# Patient Record
Sex: FEMALE | Race: BLACK OR AFRICAN AMERICAN | NOT HISPANIC OR LATINO
[De-identification: names, ages, dates, MRNs, and addresses within clinical notes are randomized per-mention and may not be internally consistent; named-entity substitution may affect disease eponyms.]

---

## 2017-03-09 ENCOUNTER — APPOINTMENT (OUTPATIENT)
Dept: CARDIOLOGY | Facility: CLINIC | Age: 82
End: 2017-03-09

## 2017-03-17 ENCOUNTER — APPOINTMENT (OUTPATIENT)
Dept: CARDIOLOGY | Facility: CLINIC | Age: 82
End: 2017-03-17

## 2017-10-20 ENCOUNTER — APPOINTMENT (OUTPATIENT)
Dept: CARDIOLOGY | Facility: CLINIC | Age: 82
End: 2017-10-20
Payer: MEDICARE

## 2017-10-20 PROCEDURE — 93000 ELECTROCARDIOGRAM COMPLETE: CPT

## 2017-10-20 PROCEDURE — 99214 OFFICE O/P EST MOD 30 MIN: CPT

## 2018-04-24 ENCOUNTER — RECORD ABSTRACTING (OUTPATIENT)
Age: 83
End: 2018-04-24

## 2018-04-26 ENCOUNTER — APPOINTMENT (OUTPATIENT)
Dept: CARDIOLOGY | Facility: CLINIC | Age: 83
End: 2018-04-26
Payer: MEDICARE

## 2018-04-26 PROCEDURE — 93306 TTE W/DOPPLER COMPLETE: CPT

## 2018-04-26 PROCEDURE — 93880 EXTRACRANIAL BILAT STUDY: CPT

## 2018-04-30 PROCEDURE — 93224 XTRNL ECG REC UP TO 48 HRS: CPT

## 2018-05-01 ENCOUNTER — RECORD ABSTRACTING (OUTPATIENT)
Age: 83
End: 2018-05-01

## 2018-05-01 RX ORDER — ASPIRIN 81 MG
81 TABLET, DELAYED RELEASE (ENTERIC COATED) ORAL DAILY
Refills: 0 | Status: ACTIVE | COMMUNITY

## 2018-05-01 RX ORDER — FEXOFENADINE HYDROCHLORIDE 180 MG/1
180 TABLET, FILM COATED ORAL DAILY
Refills: 0 | Status: ACTIVE | COMMUNITY

## 2018-05-01 RX ORDER — TOLTERODINE TARTRATE 2 MG/1
2 TABLET, FILM COATED ORAL DAILY
Refills: 0 | Status: ACTIVE | COMMUNITY

## 2018-05-01 RX ORDER — PRAVASTATIN SODIUM 20 MG/1
20 TABLET ORAL
Refills: 0 | Status: ACTIVE | COMMUNITY

## 2018-05-01 RX ORDER — LORAZEPAM 0.5 MG/1
0.5 TABLET ORAL TWICE DAILY
Refills: 0 | Status: ACTIVE | COMMUNITY

## 2018-05-01 RX ORDER — PIOGLITAZONE 45 MG/1
45 TABLET ORAL DAILY
Refills: 0 | Status: ACTIVE | COMMUNITY

## 2018-05-01 RX ORDER — SPIRONOLACTONE AND HYDROCHLOROTHIAZIDE 25; 25 MG/1; MG/1
25-25 TABLET, FILM COATED ORAL EVERY OTHER DAY
Refills: 0 | Status: ACTIVE | COMMUNITY

## 2018-05-01 RX ORDER — LIRAGLUTIDE 6 MG/ML
18 INJECTION SUBCUTANEOUS DAILY
Refills: 0 | Status: ACTIVE | COMMUNITY

## 2018-05-01 RX ORDER — METFORMIN HYDROCHLORIDE 500 MG/1
500 TABLET, COATED ORAL DAILY
Refills: 0 | Status: ACTIVE | COMMUNITY

## 2018-05-04 ENCOUNTER — RECORD ABSTRACTING (OUTPATIENT)
Age: 83
End: 2018-05-04

## 2018-05-04 ENCOUNTER — APPOINTMENT (OUTPATIENT)
Dept: CARDIOLOGY | Facility: CLINIC | Age: 83
End: 2018-05-04
Payer: MEDICARE

## 2018-05-04 VITALS
HEART RATE: 76 BPM | SYSTOLIC BLOOD PRESSURE: 110 MMHG | WEIGHT: 220 LBS | DIASTOLIC BLOOD PRESSURE: 60 MMHG | BODY MASS INDEX: 43.19 KG/M2 | OXYGEN SATURATION: 95 % | HEIGHT: 60 IN

## 2018-05-04 DIAGNOSIS — Z86.39 PERSONAL HISTORY OF OTHER ENDOCRINE, NUTRITIONAL AND METABOLIC DISEASE: ICD-10-CM

## 2018-05-04 DIAGNOSIS — Z86.59 PERSONAL HISTORY OF OTHER MENTAL AND BEHAVIORAL DISORDERS: ICD-10-CM

## 2018-05-04 DIAGNOSIS — Z86.79 PERSONAL HISTORY OF OTHER DISEASES OF THE CIRCULATORY SYSTEM: ICD-10-CM

## 2018-05-04 DIAGNOSIS — Z87.891 PERSONAL HISTORY OF NICOTINE DEPENDENCE: ICD-10-CM

## 2018-05-04 DIAGNOSIS — Z83.3 FAMILY HISTORY OF DIABETES MELLITUS: ICD-10-CM

## 2018-05-04 PROCEDURE — 99214 OFFICE O/P EST MOD 30 MIN: CPT

## 2018-09-04 ENCOUNTER — OUTPATIENT (OUTPATIENT)
Dept: OUTPATIENT SERVICES | Facility: HOSPITAL | Age: 83
LOS: 1 days | End: 2018-09-04

## 2018-11-01 ENCOUNTER — OUTPATIENT (OUTPATIENT)
Dept: OUTPATIENT SERVICES | Facility: HOSPITAL | Age: 83
LOS: 1 days | End: 2018-11-01

## 2018-12-31 ENCOUNTER — RECORD ABSTRACTING (OUTPATIENT)
Age: 83
End: 2018-12-31

## 2019-01-08 ENCOUNTER — NON-APPOINTMENT (OUTPATIENT)
Age: 84
End: 2019-01-08

## 2019-01-08 ENCOUNTER — APPOINTMENT (OUTPATIENT)
Dept: CARDIOLOGY | Facility: CLINIC | Age: 84
End: 2019-01-08
Payer: MEDICARE

## 2019-01-08 VITALS
HEART RATE: 76 BPM | OXYGEN SATURATION: 96 % | DIASTOLIC BLOOD PRESSURE: 60 MMHG | WEIGHT: 220 LBS | BODY MASS INDEX: 43.19 KG/M2 | SYSTOLIC BLOOD PRESSURE: 120 MMHG | HEIGHT: 60 IN

## 2019-01-08 PROCEDURE — 99214 OFFICE O/P EST MOD 30 MIN: CPT

## 2019-01-08 PROCEDURE — 93000 ELECTROCARDIOGRAM COMPLETE: CPT

## 2019-05-01 ENCOUNTER — APPOINTMENT (OUTPATIENT)
Dept: CARDIOLOGY | Facility: CLINIC | Age: 84
End: 2019-05-01
Payer: MEDICARE

## 2019-05-01 PROCEDURE — 93306 TTE W/DOPPLER COMPLETE: CPT

## 2019-05-01 PROCEDURE — 93880 EXTRACRANIAL BILAT STUDY: CPT

## 2019-05-06 PROCEDURE — 93224 XTRNL ECG REC UP TO 48 HRS: CPT

## 2019-06-11 ENCOUNTER — APPOINTMENT (OUTPATIENT)
Dept: CARDIOLOGY | Facility: CLINIC | Age: 84
End: 2019-06-11
Payer: MEDICARE

## 2019-06-11 VITALS
HEART RATE: 70 BPM | BODY MASS INDEX: 41.62 KG/M2 | WEIGHT: 212 LBS | OXYGEN SATURATION: 95 % | SYSTOLIC BLOOD PRESSURE: 120 MMHG | HEIGHT: 60 IN | DIASTOLIC BLOOD PRESSURE: 70 MMHG

## 2019-06-11 PROCEDURE — 99214 OFFICE O/P EST MOD 30 MIN: CPT

## 2019-06-11 NOTE — DISCUSSION/SUMMARY
[FreeTextEntry1] : Ling is a 85-year-old female with medical history detailed above and active medical issues including:\par \par - Dyspnea on exertion with multiple CAD risk factors. Patient will have noninvasive testing with a Lexascan Myoview stress test to assess for obstructive CAD.\par \par - Edema, HFpEF LVEF 60% echo Jan 2019, optimized on aldactone HCTZ\par \par - Hypertension at BP goal on diuretic therapy\par \par - Obesity.  Discussed calorie reduction and increased exercise as a weight reduction strategy.\par \par - Type 2 diabetes management with PCP\par \par - Dyslipidemia on Pravachol well tolerated\par \par Advised patient to follow active lifestyle with regular cardiovascular exercise. Patient educated on lifestyle and diet modification with low sodium low fat diet and avoidance of excessive alcohol. Patient is aware to call with any symptoms or concerns. \par  \par Patient will be seen in cardiology follow-up after noninvasive testing.\par \par Ling a followup with Dr Eugene Lane for primary care\par \par

## 2019-06-11 NOTE — REASON FOR VISIT
[Follow-Up - Clinic] : a clinic follow-up of [FreeTextEntry2] : dyspnea on exertion, multiple CAD risk factors [FreeTextEntry1] : Ling is an 85-year-old female with history of hypertension, DM 2, obesity, HFpEF, edema, claudication, palpitations, partial deafness.\par \par Patient has dyspnea with moderate exertion.Cardiovascular review of symptoms is negative for exertional chest pain, palpitations, dizziness or syncope.  No PND or orthopnea leg edema.  No bleeding or black stool.\par \par No exercise routine. Patient is walking less than 5 minutes unable to complete a treadmill stress test.  Patient will be scheduled for pharmacologic stress test.\par \par \par Echocardiogram May 2019, LVEF 65%, mild diastolic dysfunction, aneurysmal interatrial septum, normal RVSP\par \par Carotid Doppler May 2019 mild nonobstructive plaque

## 2019-06-11 NOTE — REVIEW OF SYSTEMS
[Negative] : Heme/Lymph [Joint Pain] : joint pain [Dyspnea on exertion] : dyspnea during exertion [Joint Stiffness] : joint stiffness [FreeTextEntry1] : limited exercise capacity

## 2019-06-11 NOTE — PHYSICAL EXAM
[Normal Appearance] : normal appearance [Eyelids - No Xanthelasma] : the eyelids demonstrated no xanthelasmas [No Oral Pallor] : no oral pallor [Normal Jugular Venous A Waves Present] : normal jugular venous A waves present [Respiration, Rhythm And Depth] : normal respiratory rhythm and effort [Heart Rate And Rhythm] : heart rate and rhythm were normal [Heart Sounds] : normal S1 and S2 [Bowel Sounds] : normal bowel sounds [Abdomen Soft] : soft [Abdomen Tenderness] : non-tender [Abnormal Walk] : normal gait [Nail Clubbing] : no clubbing of the fingernails [Cyanosis, Localized] : no localized cyanosis [Petechial Hemorrhages (___cm)] : no petechial hemorrhages [Skin Color & Pigmentation] : normal skin color and pigmentation [Skin Turgor] : normal skin turgor [] : no rash [Oriented To Time, Place, And Person] : oriented to person, place, and time [FreeTextEntry1] : obesity

## 2019-06-17 ENCOUNTER — MEDICATION RENEWAL (OUTPATIENT)
Age: 84
End: 2019-06-17

## 2019-06-19 ENCOUNTER — APPOINTMENT (OUTPATIENT)
Dept: CARDIOLOGY | Facility: CLINIC | Age: 84
End: 2019-06-19
Payer: MEDICARE

## 2019-06-19 PROCEDURE — A9502: CPT

## 2019-06-19 PROCEDURE — 78452 HT MUSCLE IMAGE SPECT MULT: CPT

## 2019-06-19 PROCEDURE — 93015 CV STRESS TEST SUPVJ I&R: CPT

## 2019-06-27 ENCOUNTER — APPOINTMENT (OUTPATIENT)
Dept: CARDIOLOGY | Facility: CLINIC | Age: 84
End: 2019-06-27
Payer: MEDICARE

## 2019-06-27 VITALS
WEIGHT: 218 LBS | HEART RATE: 75 BPM | DIASTOLIC BLOOD PRESSURE: 70 MMHG | OXYGEN SATURATION: 95 % | HEIGHT: 61 IN | BODY MASS INDEX: 41.16 KG/M2 | SYSTOLIC BLOOD PRESSURE: 120 MMHG

## 2019-06-27 PROCEDURE — 99214 OFFICE O/P EST MOD 30 MIN: CPT

## 2019-06-27 NOTE — PHYSICAL EXAM
[Normal Appearance] : normal appearance [Eyelids - No Xanthelasma] : the eyelids demonstrated no xanthelasmas [No Oral Pallor] : no oral pallor [Normal Jugular Venous A Waves Present] : normal jugular venous A waves present [Respiration, Rhythm And Depth] : normal respiratory rhythm and effort [Heart Sounds] : normal S1 and S2 [Heart Rate And Rhythm] : heart rate and rhythm were normal [Abdomen Tenderness] : non-tender [Bowel Sounds] : normal bowel sounds [Abdomen Soft] : soft [Nail Clubbing] : no clubbing of the fingernails [Abnormal Walk] : normal gait [Cyanosis, Localized] : no localized cyanosis [Petechial Hemorrhages (___cm)] : no petechial hemorrhages [Skin Color & Pigmentation] : normal skin color and pigmentation [Skin Turgor] : normal skin turgor [] : no rash [Oriented To Time, Place, And Person] : oriented to person, place, and time [FreeTextEntry1] : obesity

## 2019-06-27 NOTE — REASON FOR VISIT
[Follow-Up - Clinic] : a clinic follow-up of [FreeTextEntry2] : noninvasive testing for dyspnea on exertion, multiple CAD risk factors [FreeTextEntry1] : Ling is an 85-year-old female with history of hypertension, DM 2, obesity, HFpEF, edema, claudication, palpitations, partial deafness.\par \par Patient has dyspnea with moderate exertion.Cardiovascular review of symptoms is negative for exertional chest pain, palpitations, dizziness or syncope.  No PND or orthopnea leg edema.  No bleeding or black stool.\par \par No exercise routine. Patient is walking less than 5 minutes unable to complete a treadmill stress test.  Patient will be scheduled for pharmacologic stress test.\par \par Exercise Myoview stress test June 2019, LVEF 65%, small fixed apical defect and the presence of breast attenuation and normal wall motion likely artifact, no chest pain, nonischemic EKG response, baseline sinus rhythm IRBBB low-voltage\par \par Echocardiogram May 2019, LVEF 65%, mild diastolic dysfunction, aneurysmal interatrial septum, normal RVSP\par \par Carotid Doppler May 2019 mild nonobstructive plaque

## 2019-06-27 NOTE — DISCUSSION/SUMMARY
[FreeTextEntry1] : Ling is a 85-year-old female with medical history detailed above and active medical issues including:\par \par - Dyspnea on exertion with normal perfusion Myoview stress test normal LVEF June 2019.\par \par - Edema, HFpEF LVEF 60% echo Jan 2019, optimized on aldactone HCTZ\par \par - Hypertension at BP goal on diuretic therapy\par \par - Obesity.  Discussed calorie reduction and increased exercise as a weight reduction strategy.\par \par - Type 2 diabetes management with PCP\par \par - Dyslipidemia on Pravachol well tolerated\par \par Advised patient to follow active lifestyle with regular cardiovascular exercise. Patient educated on lifestyle and diet modification with low sodium low fat diet and avoidance of excessive alcohol. Patient is aware to call with any symptoms or concerns. \par  \par Patient will be seen in cardiology follow-up after noninvasive testing.\par \par Ling a followup with Dr Eugene Lane for primary care\par \par

## 2019-12-12 ENCOUNTER — APPOINTMENT (OUTPATIENT)
Dept: CARDIOLOGY | Facility: CLINIC | Age: 84
End: 2019-12-12
Payer: MEDICARE

## 2019-12-12 VITALS
OXYGEN SATURATION: 98 % | WEIGHT: 217 LBS | BODY MASS INDEX: 40.97 KG/M2 | SYSTOLIC BLOOD PRESSURE: 120 MMHG | DIASTOLIC BLOOD PRESSURE: 78 MMHG | HEIGHT: 61 IN | HEART RATE: 68 BPM

## 2019-12-12 PROCEDURE — 99214 OFFICE O/P EST MOD 30 MIN: CPT

## 2019-12-12 NOTE — DISCUSSION/SUMMARY
[FreeTextEntry1] : Ling is a 85-year-old female with medical history detailed above and active medical issues including:\par \par - Dyspnea on exertion with normal perfusion Myoview stress test normal LVEF June 2019.\par \par - Edema, HFpEF LVEF 60% echo Jan 2019, optimized on aldactone HCTZ\par \par - Hypertension at BP goal on diuretic therapy\par \par - Low claudication and nocturnal leg cramps, MELY lower extremities has ordered. \par \par - Obesity.  Discussed calorie reduction and increased exercise as a weight reduction strategy.\par \par - Type 2 diabetes management with PCP\par \par - Dyslipidemia on Pravachol well tolerated\par \par Advised patient to follow active lifestyle with regular cardiovascular exercise. Patient educated on lifestyle and diet modification with low sodium low fat diet and avoidance of excessive alcohol. Patient is aware to call with any symptoms or concerns. \par  \par Patient will be seen in cardiology follow-up 6 months, Patient will have 2-D echocardiogram to assess for  LV systolic function, wall motion and  structural heart disease. \par \par Current cardiac medications remain unchanged. Repeat labs will be ordered with PMD.\par \par Ling a followup with Dr Eugene Lane for primary care\par \par

## 2019-12-12 NOTE — REVIEW OF SYSTEMS
[Dyspnea on exertion] : dyspnea during exertion [Joint Pain] : joint pain [Joint Stiffness] : joint stiffness [Negative] : Heme/Lymph [FreeTextEntry1] : limited exercise capacity

## 2019-12-12 NOTE — PHYSICAL EXAM
[Normal Appearance] : normal appearance [Eyelids - No Xanthelasma] : the eyelids demonstrated no xanthelasmas [No Oral Pallor] : no oral pallor [Normal Jugular Venous A Waves Present] : normal jugular venous A waves present [Respiration, Rhythm And Depth] : normal respiratory rhythm and effort [Heart Sounds] : normal S1 and S2 [Heart Rate And Rhythm] : heart rate and rhythm were normal [Bowel Sounds] : normal bowel sounds [Abdomen Soft] : soft [Abdomen Tenderness] : non-tender [Abnormal Walk] : normal gait [Nail Clubbing] : no clubbing of the fingernails [Petechial Hemorrhages (___cm)] : no petechial hemorrhages [Cyanosis, Localized] : no localized cyanosis [Skin Color & Pigmentation] : normal skin color and pigmentation [Skin Turgor] : normal skin turgor [] : no rash [Oriented To Time, Place, And Person] : oriented to person, place, and time [FreeTextEntry1] : obesity

## 2020-02-12 ENCOUNTER — APPOINTMENT (OUTPATIENT)
Dept: CARDIOLOGY | Facility: CLINIC | Age: 85
End: 2020-02-12
Payer: MEDICARE

## 2020-02-12 PROCEDURE — 93923 UPR/LXTR ART STDY 3+ LVLS: CPT

## 2020-06-12 ENCOUNTER — APPOINTMENT (OUTPATIENT)
Dept: CARDIOLOGY | Facility: CLINIC | Age: 85
End: 2020-06-12

## 2020-07-30 ENCOUNTER — APPOINTMENT (OUTPATIENT)
Dept: CARDIOLOGY | Facility: CLINIC | Age: 85
End: 2020-07-30
Payer: MEDICARE

## 2020-07-30 PROCEDURE — 93306 TTE W/DOPPLER COMPLETE: CPT

## 2020-08-05 ENCOUNTER — APPOINTMENT (OUTPATIENT)
Dept: CARDIOLOGY | Facility: CLINIC | Age: 85
End: 2020-08-05
Payer: MEDICARE

## 2020-08-05 ENCOUNTER — NON-APPOINTMENT (OUTPATIENT)
Age: 85
End: 2020-08-05

## 2020-08-05 VITALS
WEIGHT: 207 LBS | OXYGEN SATURATION: 96 % | HEIGHT: 61 IN | HEART RATE: 80 BPM | BODY MASS INDEX: 39.08 KG/M2 | SYSTOLIC BLOOD PRESSURE: 110 MMHG | DIASTOLIC BLOOD PRESSURE: 62 MMHG

## 2020-08-05 PROCEDURE — 99214 OFFICE O/P EST MOD 30 MIN: CPT

## 2020-08-05 PROCEDURE — 93000 ELECTROCARDIOGRAM COMPLETE: CPT

## 2020-08-05 NOTE — REVIEW OF SYSTEMS
[Joint Pain] : joint pain [Dyspnea on exertion] : dyspnea during exertion [Joint Stiffness] : joint stiffness [Negative] : Heme/Lymph [FreeTextEntry1] : limited exercise capacity

## 2020-08-05 NOTE — REASON FOR VISIT
[Follow-Up - Clinic] : a clinic follow-up of [FreeTextEntry1] : Ling is an 86-year-old female with history of hypertension, DM 2, obesity, HFpEF, edema, claudication, palpitations, partial deafness.\par \par Patient has dyspnea with moderate exertion unchanged. Patient has complaints of leg claudication and nocturnal leg cramping. Cardiovascular review of symptoms is negative for exertional chest pain, palpitations, dizziness or syncope.  No PND or orthopnea leg edema.  No bleeding or black stool.\par \par No exercise routine. Patient is walking less than 5 minutes. \par \par Exercise Myoview stress test June 2019, LVEF 65%, small fixed apical defect and the presence of breast attenuation and normal wall motion likely artifact, no chest pain, nonischemic EKG response, baseline sinus rhythm IRBBB low-voltage\par \par Echocardiogram May 2019, LVEF 65%, mild diastolic dysfunction, aneurysmal interatrial septum, normal RVSP\par \par Carotid Doppler May 2019 mild nonobstructive plaque [FreeTextEntry2] : dyspnea on exertion, multiple CAD risk factors

## 2020-08-05 NOTE — PHYSICAL EXAM
[Normal Appearance] : normal appearance [Eyelids - No Xanthelasma] : the eyelids demonstrated no xanthelasmas [No Oral Pallor] : no oral pallor [Normal Jugular Venous A Waves Present] : normal jugular venous A waves present [Respiration, Rhythm And Depth] : normal respiratory rhythm and effort [Heart Rate And Rhythm] : heart rate and rhythm were normal [Bowel Sounds] : normal bowel sounds [Heart Sounds] : normal S1 and S2 [Abdomen Tenderness] : non-tender [Abdomen Soft] : soft [Cyanosis, Localized] : no localized cyanosis [Nail Clubbing] : no clubbing of the fingernails [Petechial Hemorrhages (___cm)] : no petechial hemorrhages [Abnormal Walk] : normal gait [Skin Color & Pigmentation] : normal skin color and pigmentation [Skin Turgor] : normal skin turgor [] : no rash [Oriented To Time, Place, And Person] : oriented to person, place, and time [FreeTextEntry1] : obesity

## 2020-08-05 NOTE — DISCUSSION/SUMMARY
[FreeTextEntry1] : Ling is a 86-year-old female with medical history detailed above and active medical issues including:\par \par - Dyspnea on exertion with normal perfusion Myoview stress test normal LVEF June 2019.\par \par - Edema, HFpEF LVEF 60% echo Jan 2019, optimized on aldactone HCTZ\par \par - Hypertension at BP goal on diuretic therapy\par \par - Low claudication and nocturnal leg cramps, MELY lower extremities has ordered. \par \par - Obesity.  Discussed calorie reduction and increased exercise as a weight reduction strategy.\par \par - Type 2 diabetes management with PCP\par \par - Dyslipidemia on Pravachol well tolerated\par \par Advised patient to follow active lifestyle with regular cardiovascular exercise. Patient educated on lifestyle and diet modification with low sodium low fat diet and avoidance of excessive alcohol. Patient is aware to call with any symptoms or concerns. \par  \par Patient will be seen in cardiology follow-up 6 months.  Current cardiac medications remain unchanged. Repeat labs will be ordered with PMD.\par \par Current cardiac medications remain unchanged. Repeat labs will be ordered with PMD.\par \par Ling a followup with Dr Eugene Lane for primary care\par \par

## 2021-02-18 ENCOUNTER — APPOINTMENT (OUTPATIENT)
Dept: CARDIOLOGY | Facility: CLINIC | Age: 86
End: 2021-02-18

## 2021-04-23 ENCOUNTER — APPOINTMENT (OUTPATIENT)
Dept: CARDIOLOGY | Facility: CLINIC | Age: 86
End: 2021-04-23
Payer: MEDICARE

## 2021-04-23 VITALS
OXYGEN SATURATION: 96 % | WEIGHT: 212 LBS | SYSTOLIC BLOOD PRESSURE: 122 MMHG | DIASTOLIC BLOOD PRESSURE: 68 MMHG | TEMPERATURE: 97.3 F | HEART RATE: 80 BPM | HEIGHT: 60 IN | BODY MASS INDEX: 41.62 KG/M2

## 2021-04-23 PROCEDURE — 99214 OFFICE O/P EST MOD 30 MIN: CPT

## 2021-04-23 NOTE — DISCUSSION/SUMMARY
[FreeTextEntry1] : Ling is a 86-year-old female with medical history detailed above and active medical issues including:\par \par - Dyspnea on exertion, multiple CAD risk factors, sedentary lifestyle.  Patient will have noninvasive testing with a Lexiscan Myoview stress test to assess for obstructive CAD, echocardiogram for LVEF, wall motion, structural heart disease,  carotid and abdominal ultrasound to assess for obstructive PAD. \par \par - Edema, HFpEF LVEF 60% echo Jan 2019, optimized on aldactone HCTZ\par \par - Hypertension BP at giudeline goal on diuretic therapy\par \par - Leg claudication and nocturnal leg cramps, MELY lower extremities has ordered. \par \par - Obesity.  Discussed calorie reduction and increased exercise as a weight reduction strategy.\par \par - Type 2 diabetes management with PCP\par \par - Dyslipidemia on Pravachol well tolerated\par \par Advised patient to follow active lifestyle with regular cardiovascular exercise. Patient educated on lifestyle and diet modification with low sodium low fat diet and avoidance of excessive alcohol. Patient is aware to call with any symptoms or concerns. \par  \par Patient will be seen in cardiology follow-up after noninvasive testing.  Current cardiac medications remain unchanged. Repeat labs will be ordered with PMD.\par \par Current cardiac medications remain unchanged. Repeat labs will be ordered with PMD.\par \par Ling a followup with Dr Eugene Lane for primary care\par \par

## 2021-04-23 NOTE — REASON FOR VISIT
[Follow-Up - Clinic] : a clinic follow-up of [FreeTextEntry2] : dyspnea on exertion, multiple CAD risk factors [FreeTextEntry1] : Ling is an 87-year-old female with history of hypertension, DM 2, obesity, HFpEF, edema, claudication, palpitations, partial deafness.\par \par Patient has dyspnea with moderate exertion. Patient has complaints of leg claudication and nocturnal leg cramping. Cardiovascular review of symptoms is negative for exertional chest pain, palpitations, dizziness or syncope.  No PND or orthopnea leg edema.  No bleeding or black stool.\par \par No exercise routine. Patient is walking less than 5 minutes and will be scheduled for pharmacologic stress test. \par \par Exercise Myoview stress test June 2019, LVEF 65%, small fixed apical defect and the presence of breast attenuation and normal wall motion likely artifact, no chest pain, nonischemic EKG response, baseline sinus rhythm IRBBB low-voltage\par \par Echocardiogram May 2019, LVEF 65%, mild diastolic dysfunction, aneurysmal interatrial septum, normal RVSP\par \par Carotid Doppler May 2019 mild nonobstructive plaque

## 2021-10-05 ENCOUNTER — APPOINTMENT (OUTPATIENT)
Dept: CARDIOLOGY | Facility: CLINIC | Age: 86
End: 2021-10-05
Payer: MEDICARE

## 2021-10-05 PROCEDURE — 93015 CV STRESS TEST SUPVJ I&R: CPT

## 2021-10-05 PROCEDURE — A9502: CPT

## 2021-10-05 PROCEDURE — 93880 EXTRACRANIAL BILAT STUDY: CPT

## 2021-10-05 PROCEDURE — 78452 HT MUSCLE IMAGE SPECT MULT: CPT

## 2021-10-05 PROCEDURE — 93979 VASCULAR STUDY: CPT

## 2021-10-05 PROCEDURE — 93306 TTE W/DOPPLER COMPLETE: CPT

## 2021-10-15 ENCOUNTER — APPOINTMENT (OUTPATIENT)
Dept: CARDIOLOGY | Facility: CLINIC | Age: 86
End: 2021-10-15
Payer: MEDICARE

## 2021-10-15 ENCOUNTER — NON-APPOINTMENT (OUTPATIENT)
Age: 86
End: 2021-10-15

## 2021-10-15 VITALS
HEART RATE: 65 BPM | DIASTOLIC BLOOD PRESSURE: 62 MMHG | OXYGEN SATURATION: 97 % | HEIGHT: 60 IN | SYSTOLIC BLOOD PRESSURE: 110 MMHG | BODY MASS INDEX: 40.64 KG/M2 | WEIGHT: 207 LBS | TEMPERATURE: 97.7 F

## 2021-10-15 PROCEDURE — 99214 OFFICE O/P EST MOD 30 MIN: CPT

## 2021-10-15 NOTE — DISCUSSION/SUMMARY
[FreeTextEntry1] : Ling is a 86-year-old female with medical history detailed above and active medical issues including:\par \par - Dyspnea on exertion, multiple CAD risk factors, sedentary lifestyle.  Normal perfusion Myoview stress test with normal LVEF Oct 2021\par \par - Edema, HFpEF LVEF 60% echo Jan 2019, optimized on aldactone HCTZ\par \par - Hypertension average resting home BPs at giudeline goal on diuretic therapy\par \par - Leg claudication and nocturnal leg cramps, MELY lower extremities has ordered. \par \par - Obesity.  Discussed calorie reduction and increased exercise as a weight reduction strategy.\par \par - Type 2 diabetes management with PCP\par \par - Dyslipidemia on Pravachol well tolerated\par \par Advised patient to follow active lifestyle with regular cardiovascular exercise. Patient educated on lifestyle and diet modification with low sodium low fat diet and avoidance of excessive alcohol. Patient is aware to call with any symptoms or concerns. \par  \par Patient will be seen in cardiology follow-up 6 months.  Current cardiac medications remain unchanged. Repeat labs will be ordered with PMD.\par \par Ling a followup with Dr Eugene Lane for primary care\par \par

## 2021-10-15 NOTE — REASON FOR VISIT
[Follow-Up - Clinic] : a clinic follow-up of [FreeTextEntry1] : Ling is an 87-year-old female with history of hypertension, DM 2, obesity, HFpEF, edema, claudication, palpitations, partial deafness.\par \par Patient has dyspnea with moderate exertion. Patient has complaints of leg claudication and nocturnal leg cramping. Cardiovascular review of symptoms is negative for exertional chest pain, palpitations, dizziness or syncope.  No PND or orthopnea leg edema.  No bleeding or black stool.\par \par No exercise routine. Patient is walking less than 10 minutes on occasion.  Discussed maintaining 4-5,000 steps per day \par \par Lexiscan Myoview stress test Oct 2021, V EF 70%, normal perfusion, breast attenuation seen, hemic EKG response, no chest pain, baseline sinus rhythm.\par \par Echocardiogram October 2021, LVEF 60%, mild MR, normal RVSP.\par \par Carotid and abdominal ultrasound October 2021, mild nonobstructive plaque, normal abdominal aortic size.\par \par Exercise Myoview stress test June 2019, LVEF 65%, small fixed apical defect and the presence of breast attenuation and normal wall motion likely artifact, no chest pain, nonischemic EKG response, baseline sinus rhythm IRBBB low-voltage\par \par Echocardiogram May 2019, LVEF 65%, mild diastolic dysfunction, aneurysmal interatrial septum, normal RVSP\par \par Carotid Doppler May 2019 mild nonobstructive plaque [FreeTextEntry2] : dyspnea on exertion, multiple CAD risk factors

## 2021-11-30 ENCOUNTER — APPOINTMENT (OUTPATIENT)
Dept: OPHTHALMOLOGY | Facility: CLINIC | Age: 86
End: 2021-11-30
Payer: MEDICARE

## 2021-11-30 ENCOUNTER — NON-APPOINTMENT (OUTPATIENT)
Age: 86
End: 2021-11-30

## 2021-11-30 PROCEDURE — 92014 COMPRE OPH EXAM EST PT 1/>: CPT

## 2021-12-15 ENCOUNTER — APPOINTMENT (OUTPATIENT)
Dept: OPHTHALMOLOGY | Facility: CLINIC | Age: 86
End: 2021-12-15
Payer: COMMERCIAL

## 2021-12-15 ENCOUNTER — NON-APPOINTMENT (OUTPATIENT)
Age: 86
End: 2021-12-15

## 2021-12-15 PROCEDURE — 92012 INTRM OPH EXAM EST PATIENT: CPT

## 2021-12-15 PROCEDURE — 99072 ADDL SUPL MATRL&STAF TM PHE: CPT

## 2022-04-05 ENCOUNTER — APPOINTMENT (OUTPATIENT)
Dept: CARDIOLOGY | Facility: CLINIC | Age: 87
End: 2022-04-05
Payer: MEDICARE

## 2022-04-05 VITALS
WEIGHT: 210 LBS | HEART RATE: 78 BPM | TEMPERATURE: 97 F | OXYGEN SATURATION: 99 % | BODY MASS INDEX: 41.23 KG/M2 | HEIGHT: 60 IN | SYSTOLIC BLOOD PRESSURE: 122 MMHG | DIASTOLIC BLOOD PRESSURE: 58 MMHG

## 2022-04-05 PROCEDURE — 99214 OFFICE O/P EST MOD 30 MIN: CPT

## 2022-04-05 NOTE — PHYSICAL EXAM
[Well Developed] : well developed [Well Nourished] : well nourished [No Acute Distress] : no acute distress [Obese] : obese [Normal Conjunctiva] : normal conjunctiva [Normal Venous Pressure] : normal venous pressure [No Carotid Bruit] : no carotid bruit [Normal S1, S2] : normal S1, S2 [No Murmur] : no murmur [No Rub] : no rub [No Gallop] : no gallop [Clear Lung Fields] : clear lung fields [Good Air Entry] : good air entry [No Respiratory Distress] : no respiratory distress  [Soft] : abdomen soft [Non Tender] : non-tender [No Masses/organomegaly] : no masses/organomegaly [Normal Bowel Sounds] : normal bowel sounds [Normal Gait] : normal gait [No Cyanosis] : no cyanosis [No Clubbing] : no clubbing [No Varicosities] : no varicosities [No Rash] : no rash [No Skin Lesions] : no skin lesions [Moves all extremities] : moves all extremities [No Focal Deficits] : no focal deficits [Normal Speech] : normal speech [Alert and Oriented] : alert and oriented [Normal memory] : normal memory [Normal Appearance] : normal appearance [Eyelids - No Xanthelasma] : the eyelids demonstrated no xanthelasmas [No Oral Pallor] : no oral pallor [Normal Jugular Venous A Waves Present] : normal jugular venous A waves present [Respiration, Rhythm And Depth] : normal respiratory rhythm and effort [Heart Rate And Rhythm] : heart rate and rhythm were normal [Heart Sounds] : normal S1 and S2 [Bowel Sounds] : normal bowel sounds [Abdomen Soft] : soft [Abdomen Tenderness] : non-tender [Abnormal Walk] : normal gait [Nail Clubbing] : no clubbing of the fingernails [Cyanosis, Localized] : no localized cyanosis [Petechial Hemorrhages (___cm)] : no petechial hemorrhages [Skin Color & Pigmentation] : normal skin color and pigmentation [Skin Turgor] : normal skin turgor [] : no rash [Oriented To Time, Place, And Person] : oriented to person, place, and time [de-identified] : mild bipedal edema, left > right [FreeTextEntry1] : obesity

## 2022-04-05 NOTE — REASON FOR VISIT
[Other: ____] : [unfilled] [FreeTextEntry1] : Ling is an 88-year-old female with history of hypertension, DM 2, obesity, HFpEF, edema, claudication, palpitations, partial deafness.\par \par Patient has mild dependent leg edema, left leg greater than right.  Patient has dyspnea with moderate exertion. Patient has complaints of leg claudication and nocturnal leg cramping. Cardiovascular review of symptoms is negative for exertional chest pain, palpitations, dizziness or syncope.  No PND or orthopnea leg edema.  No bleeding or black stool.\par \par No exercise routine. Patient is walking less than 10 minutes on occasion.  Patient is maintaining 2,000 steps per day \par \par Lexiscan Myoview stress test Oct 2021, V EF 70%, normal perfusion, breast attenuation seen, hemic EKG response, no chest pain, baseline sinus rhythm.\par \par Echocardiogram October 2021, LVEF 60%, mild MR, normal RVSP.\par \par Carotid and abdominal ultrasound October 2021, mild nonobstructive plaque, normal abdominal aortic size.\par \par Exercise Myoview stress test June 2019, LVEF 65%, small fixed apical defect and the presence of breast attenuation and normal wall motion likely artifact, no chest pain, nonischemic EKG response, baseline sinus rhythm IRBBB low-voltage\par \par Echocardiogram May 2019, LVEF 65%, mild diastolic dysfunction, aneurysmal interatrial septum, normal RVSP\par \par Carotid Doppler May 2019 mild nonobstructive plaque

## 2022-04-05 NOTE — DISCUSSION/SUMMARY
[FreeTextEntry1] : Ling is a 88-year-old female with medical history detailed above and active medical issues including:\par \par - Dyspnea on exertion, multiple CAD risk factors, sedentary lifestyle.  Normal perfusion Myoview stress test with normal LVEF Oct 2021\par \par - Edema, HFpEF LVEF 60% echo Jan 2019, optimized on aldactone HCTZ\par \par - Hypertension average resting home BPs at giudeline goal on diuretic therapy\par \par - Leg claudication and nocturnal leg cramps, MELY lower extremities has ordered. \par \par - Obesity.  Discussed calorie reduction and increased exercise as a weight reduction strategy.\par \par - Type 2 diabetes management with PCP\par \par - Dyslipidemia on Pravachol well tolerated\par \par Advised patient to follow active lifestyle with regular cardiovascular exercise. Patient educated on lifestyle and diet modification with low sodium low fat diet and avoidance of excessive alcohol. Patient is aware to call with any symptoms or concerns. \par  \par Patient will be seen in cardiology follow-up 6 months same-day echocardiogram..  Current cardiac medications remain unchanged. Repeat labs will be ordered with PMD.\par \par Ling a followup with Dr Eugene Lane for primary care\par \par

## 2022-10-04 ENCOUNTER — APPOINTMENT (OUTPATIENT)
Dept: CARDIOLOGY | Facility: CLINIC | Age: 87
End: 2022-10-04

## 2022-10-04 ENCOUNTER — NON-APPOINTMENT (OUTPATIENT)
Age: 87
End: 2022-10-04

## 2022-10-04 VITALS
DIASTOLIC BLOOD PRESSURE: 78 MMHG | OXYGEN SATURATION: 97 % | WEIGHT: 211 LBS | BODY MASS INDEX: 41.43 KG/M2 | HEART RATE: 73 BPM | TEMPERATURE: 97 F | HEIGHT: 60 IN | SYSTOLIC BLOOD PRESSURE: 128 MMHG

## 2022-10-04 PROCEDURE — 99215 OFFICE O/P EST HI 40 MIN: CPT

## 2022-10-04 PROCEDURE — 93000 ELECTROCARDIOGRAM COMPLETE: CPT

## 2022-10-04 PROCEDURE — 93306 TTE W/DOPPLER COMPLETE: CPT

## 2022-10-04 RX ORDER — NITROFURANTOIN (MACROCRYSTALS) 50 MG/1
50 CAPSULE ORAL
Refills: 0 | Status: ACTIVE | COMMUNITY

## 2022-10-04 NOTE — DISCUSSION/SUMMARY
[FreeTextEntry1] : Ling is a 89-year-old female with medical history detailed above and active medical issues including:\par \par - Dyspnea on exertion, multiple CAD risk factors, sedentary lifestyle.  Normal perfusion Myoview stress test with normal LVEF Oct 2021\par \par - Edema, HFpEF LVEF 60% echo Oct 2021 , optimized on aldactone HCTZ\par \par - Hypertension average resting home BPs at giudeline goal on diuretic therapy\par \par - Leg claudication and nocturnal leg cramps. \par \par - Obesity.  Discussed calorie reduction and increased exercise as a weight reduction strategy.\par \par - Type 2 diabetes management with PCP\par \par - Dyslipidemia on Pravachol well tolerated\par \par Advised patient to follow active lifestyle with regular cardiovascular exercise. Patient educated on lifestyle and diet modification with low sodium low fat diet and avoidance of excessive alcohol. Patient is aware to call with any symptoms or concerns. \par  \par Patient will be seen in cardiology follow-up 6 months.  Current cardiac medications remain unchanged. Repeat labs will be ordered with PMD.\par \par Ling a followup with Dr Eugene Lane for primary care\par \par

## 2022-10-04 NOTE — REASON FOR VISIT
[Other: ____] : [unfilled] [FreeTextEntry1] : Ling is an 89-year-old female with history of hypertension, DM 2, obesity, HFpEF, edema, claudication, palpitations, partial deafness.\par \par Patient has mild dependent leg edema, left leg greater than right.  Patient has dyspnea with moderate exertion. Patient has complaints of leg claudication and nocturnal leg cramping. Cardiovascular review of symptoms is negative for exertional chest pain, palpitations, dizziness or syncope.  No PND or orthopnea leg edema.  No bleeding or black stool.\par \par No exercise routine. Patient is walking less than 10 minutes on occasion.  Patient is maintaining 2,000 steps per day \par \par Echocardiogram Oct 2022 LVEF 60%, mild MR and TR, normal RVSP.\par \par Lexiscan Myoview stress test Oct 2021, V EF 70%, normal perfusion, breast attenuation seen, hemic EKG response, no chest pain, baseline sinus rhythm.\par \par Echocardiogram October 2021, LVEF 60%, mild MR, normal RVSP.\par \par Carotid and abdominal ultrasound October 2021, mild nonobstructive plaque, normal abdominal aortic size.\par \par Exercise Myoview stress test June 2019, LVEF 65%, small fixed apical defect and the presence of breast attenuation and normal wall motion likely artifact, no chest pain, nonischemic EKG response, baseline sinus rhythm IRBBB low-voltage\par \par Echocardiogram May 2019, LVEF 65%, mild diastolic dysfunction, aneurysmal interatrial septum, normal RVSP\par \par Carotid Doppler May 2019 mild nonobstructive plaque

## 2022-10-04 NOTE — PHYSICAL EXAM
[Well Developed] : well developed [Well Nourished] : well nourished [No Acute Distress] : no acute distress [Obese] : obese [Normal Conjunctiva] : normal conjunctiva [Normal Venous Pressure] : normal venous pressure [No Carotid Bruit] : no carotid bruit [Normal S1, S2] : normal S1, S2 [No Murmur] : no murmur [No Rub] : no rub [No Gallop] : no gallop [Clear Lung Fields] : clear lung fields [Good Air Entry] : good air entry [No Respiratory Distress] : no respiratory distress  [Soft] : abdomen soft [Non Tender] : non-tender [No Masses/organomegaly] : no masses/organomegaly [Normal Bowel Sounds] : normal bowel sounds [Normal Gait] : normal gait [No Cyanosis] : no cyanosis [No Clubbing] : no clubbing [No Varicosities] : no varicosities [No Rash] : no rash [No Skin Lesions] : no skin lesions [Moves all extremities] : moves all extremities [No Focal Deficits] : no focal deficits [Normal Speech] : normal speech [Alert and Oriented] : alert and oriented [Normal memory] : normal memory [Normal Appearance] : normal appearance [Eyelids - No Xanthelasma] : the eyelids demonstrated no xanthelasmas [No Oral Pallor] : no oral pallor [Normal Jugular Venous A Waves Present] : normal jugular venous A waves present [Respiration, Rhythm And Depth] : normal respiratory rhythm and effort [Heart Rate And Rhythm] : heart rate and rhythm were normal [Heart Sounds] : normal S1 and S2 [Bowel Sounds] : normal bowel sounds [Abdomen Soft] : soft [Abdomen Tenderness] : non-tender [Abnormal Walk] : normal gait [Nail Clubbing] : no clubbing of the fingernails [Cyanosis, Localized] : no localized cyanosis [Petechial Hemorrhages (___cm)] : no petechial hemorrhages [Skin Color & Pigmentation] : normal skin color and pigmentation [Skin Turgor] : normal skin turgor [] : no rash [Oriented To Time, Place, And Person] : oriented to person, place, and time [de-identified] : mild bipedal edema, left > right [FreeTextEntry1] : obesity

## 2023-04-18 ENCOUNTER — APPOINTMENT (OUTPATIENT)
Dept: CARDIOLOGY | Facility: CLINIC | Age: 88
End: 2023-04-18
Payer: MEDICARE

## 2023-04-18 VITALS
HEIGHT: 60 IN | HEART RATE: 73 BPM | SYSTOLIC BLOOD PRESSURE: 132 MMHG | OXYGEN SATURATION: 96 % | BODY MASS INDEX: 40.25 KG/M2 | WEIGHT: 205 LBS | DIASTOLIC BLOOD PRESSURE: 72 MMHG

## 2023-04-18 PROCEDURE — 99214 OFFICE O/P EST MOD 30 MIN: CPT

## 2023-04-18 NOTE — PHYSICAL EXAM
[Well Developed] : well developed [Well Nourished] : well nourished [No Acute Distress] : no acute distress [Obese] : obese [Normal Conjunctiva] : normal conjunctiva [Normal Venous Pressure] : normal venous pressure [No Carotid Bruit] : no carotid bruit [Normal S1, S2] : normal S1, S2 [No Murmur] : no murmur [No Rub] : no rub [No Gallop] : no gallop [Clear Lung Fields] : clear lung fields [Good Air Entry] : good air entry [No Respiratory Distress] : no respiratory distress  [Soft] : abdomen soft [Non Tender] : non-tender [No Masses/organomegaly] : no masses/organomegaly [Normal Bowel Sounds] : normal bowel sounds [Normal Gait] : normal gait [No Cyanosis] : no cyanosis [No Clubbing] : no clubbing [No Varicosities] : no varicosities [No Rash] : no rash [No Skin Lesions] : no skin lesions [Moves all extremities] : moves all extremities [No Focal Deficits] : no focal deficits [Normal Speech] : normal speech [Alert and Oriented] : alert and oriented [Normal memory] : normal memory [Normal Appearance] : normal appearance [Eyelids - No Xanthelasma] : the eyelids demonstrated no xanthelasmas [No Oral Pallor] : no oral pallor [Normal Jugular Venous A Waves Present] : normal jugular venous A waves present [Respiration, Rhythm And Depth] : normal respiratory rhythm and effort [Heart Rate And Rhythm] : heart rate and rhythm were normal [Heart Sounds] : normal S1 and S2 [Bowel Sounds] : normal bowel sounds [Abdomen Soft] : soft [Abdomen Tenderness] : non-tender [Abnormal Walk] : normal gait [Nail Clubbing] : no clubbing of the fingernails [Cyanosis, Localized] : no localized cyanosis [Petechial Hemorrhages (___cm)] : no petechial hemorrhages [Skin Color & Pigmentation] : normal skin color and pigmentation [Skin Turgor] : normal skin turgor [] : no rash [Oriented To Time, Place, And Person] : oriented to person, place, and time [de-identified] : mild bipedal edema, left > right [FreeTextEntry1] : obesity

## 2023-04-18 NOTE — REASON FOR VISIT
[Other: ____] : [unfilled] [FreeTextEntry1] : Ling is an 89-year-old female with history of hypertension, DM 2, obesity, HFpEF,  LVEF 60% echo Oct 2021, edema, claudication, palpitations, partial deafness.\par \par Patient has dyspnea with moderate exertion. Patient has complaints of leg claudication and nocturnal leg cramping. Cardiovascular review of symptoms is negative for exertional chest pain, palpitations, dizziness or syncope.  No PND or orthopnea leg edema.  No bleeding or black stool.\par \par No exercise routine. Patient is walking less than 10 minutes on occasion.  Patient is maintaining 2,000 steps per day \par \par Lexiscan Myoview stress test Oct 2021, V EF 70%, normal perfusion, breast attenuation seen, hemic EKG response, no chest pain, baseline sinus rhythm.\par \par Echocardiogram October 2021, LVEF 60%, mild MR, normal RVSP.\par \par Carotid and abdominal ultrasound October 2021, mild nonobstructive plaque, normal abdominal aortic size.\par \par Exercise Myoview stress test June 2019, LVEF 65%, small fixed apical defect and the presence of breast attenuation and normal wall motion likely artifact, no chest pain, nonischemic EKG response, baseline sinus rhythm IRBBB low-voltage\par \par Echocardiogram May 2019, LVEF 65%, mild diastolic dysfunction, aneurysmal interatrial septum, normal RVSP\par \par Carotid Doppler May 2019 mild nonobstructive plaque

## 2023-04-18 NOTE — DISCUSSION/SUMMARY
[FreeTextEntry1] : Ling is a 89-year-old female with medical history detailed above and active medical issues including:\par \par - Dyspnea on exertion, multiple CAD risk factors, sedentary lifestyle.  Normal perfusion Myoview stress test with normal LVEF Oct 2021\par \par - Edema, HFpEF LVEF 60% echo Jan 2019, optimized on aldactone \par \par - Hypertension average resting home BPs at giudeline goal on diuretic therapy\par \par - Leg claudication and nocturnal leg cramps, MELY lower extremities has ordered. \par \par - Obesity.  Discussed calorie reduction and increased exercise as a weight reduction strategy.\par \par - Type 2 diabetes management with PCP\par \par - Dyslipidemia on Pravachol well tolerated\par \par Advised patient to follow active lifestyle with regular cardiovascular exercise. Patient educated on lifestyle and diet modification with low sodium low fat diet and avoidance of excessive alcohol. Patient is aware to call with any symptoms or concerns. \par  \par Patient will be seen in cardiology follow-up 6 months same-day echocardiogram and doppler studies.  Current cardiac medications remain unchanged. Repeat labs will be ordered with PMD.\par \par Ling a followup with Dr Eugene Lane for primary care\par \par

## 2023-05-04 ENCOUNTER — OFFICE (OUTPATIENT)
Dept: URBAN - METROPOLITAN AREA CLINIC 8 | Facility: CLINIC | Age: 88
Setting detail: OPHTHALMOLOGY
End: 2023-05-04
Payer: MEDICARE

## 2023-05-04 DIAGNOSIS — H52.4: ICD-10-CM

## 2023-05-04 DIAGNOSIS — H01.004: ICD-10-CM

## 2023-05-04 DIAGNOSIS — H01.005: ICD-10-CM

## 2023-05-04 DIAGNOSIS — Z96.1: ICD-10-CM

## 2023-05-04 DIAGNOSIS — H01.001: ICD-10-CM

## 2023-05-04 DIAGNOSIS — H01.002: ICD-10-CM

## 2023-05-04 DIAGNOSIS — E11.9: ICD-10-CM

## 2023-05-04 DIAGNOSIS — H35.363: ICD-10-CM

## 2023-05-04 DIAGNOSIS — H16.223: ICD-10-CM

## 2023-05-04 PROCEDURE — 92015 DETERMINE REFRACTIVE STATE: CPT | Performed by: OPHTHALMOLOGY

## 2023-05-04 PROCEDURE — 92134 CPTRZ OPH DX IMG PST SGM RTA: CPT | Performed by: OPHTHALMOLOGY

## 2023-05-04 PROCEDURE — 92014 COMPRE OPH EXAM EST PT 1/>: CPT | Performed by: OPHTHALMOLOGY

## 2023-05-04 ASSESSMENT — REFRACTION_MANIFEST
OD_CYLINDER: -1.75
OU_VA: 20/25
OD_AXIS: 070
OD_CYLINDER: -1.00
OD_SPHERE: +0.75
OD_ADD: +3.00
OS_AXIS: 080
OD_SPHERE: +0.75
OS_CYLINDER: -1.25
OS_VA1: 20/30
OS_ADD: +3.00
OS_VA1: 20/30
OD_VA1: 20/30
OS_VA2: 20/20(J1+)
OU_VA: 20/25
OS_AXIS: 080
OD_AXIS: 070
OD_VA1: 20/30
OD_VA2: 20/20(J1+)
OS_CYLINDER: -1.25
OS_VA2: 20/20(J1+)
OS_SPHERE: +0.25
OS_SPHERE: +0.50
OS_ADD: +2.75
OD_ADD: +2.75
OD_VA2: 20/20(J1+)

## 2023-05-04 ASSESSMENT — SPHEQUIV_DERIVED
OS_SPHEQUIV: -0.125
OD_SPHEQUIV: -0.125
OS_SPHEQUIV: -0.375
OD_SPHEQUIV: 0.125
OD_SPHEQUIV: 0.25
OS_SPHEQUIV: -0.125

## 2023-05-04 ASSESSMENT — REFRACTION_CURRENTRX
OS_AXIS: 090
OS_OVR_VA: 20/
OS_ADD: +2.75
OD_OVR_VA: 20/
OD_CYLINDER: -1.00
OD_AXIS: 091
OS_VPRISM_DIRECTION: BF
OD_ADD: +2.75
OS_CYLINDER: -1.50
OD_SPHERE: +0.50
OS_SPHERE: +0.75
OD_VPRISM_DIRECTION: BF

## 2023-05-04 ASSESSMENT — VISUAL ACUITY
OD_BCVA: 20/40-1
OS_BCVA: 20/30-1

## 2023-05-04 ASSESSMENT — KERATOMETRY
OD_K2POWER_DIOPTERS: 44.75
OD_AXISANGLE_DEGREES: 122
OS_K2POWER_DIOPTERS: 43.75
OD_K1POWER_DIOPTERS: 43.75
OS_AXISANGLE_DEGREES: 090
OS_K1POWER_DIOPTERS: 43.75

## 2023-05-04 ASSESSMENT — AXIALLENGTH_DERIVED
OS_AL: 23.5489
OS_AL: 23.6463
OD_AL: 23.2248
OS_AL: 23.5489
OD_AL: 23.2721
OD_AL: 23.3673

## 2023-05-04 ASSESSMENT — TONOMETRY
OS_IOP_MMHG: 21
OD_IOP_MMHG: 20

## 2023-05-04 ASSESSMENT — REFRACTION_AUTOREFRACTION
OS_CYLINDER: -1.25
OD_SPHERE: +0.75
OD_AXIS: 072
OD_CYLINDER: -1.25
OS_AXIS: 078
OS_SPHERE: +0.50

## 2023-05-04 ASSESSMENT — CONFRONTATIONAL VISUAL FIELD TEST (CVF)
OD_FINDINGS: FULL
OS_FINDINGS: FULL

## 2023-05-04 ASSESSMENT — SUPERFICIAL PUNCTATE KERATITIS (SPK)
OS_SPK: 1+
OD_SPK: 1+

## 2023-05-04 ASSESSMENT — LID EXAM ASSESSMENTS
OD_BLEPHARITIS: RLL RUL 1+
OS_BLEPHARITIS: LLL LUL 1+

## 2023-06-23 ENCOUNTER — APPOINTMENT (OUTPATIENT)
Dept: CARDIOLOGY | Facility: CLINIC | Age: 88
End: 2023-06-23
Payer: MEDICARE

## 2023-07-27 ENCOUNTER — OFFICE (OUTPATIENT)
Dept: URBAN - METROPOLITAN AREA CLINIC 38 | Facility: CLINIC | Age: 88
Setting detail: OPHTHALMOLOGY
End: 2023-07-27
Payer: MEDICARE

## 2023-07-27 DIAGNOSIS — H01.001: ICD-10-CM

## 2023-07-27 DIAGNOSIS — H01.004: ICD-10-CM

## 2023-07-27 DIAGNOSIS — H00.11: ICD-10-CM

## 2023-07-27 PROCEDURE — 99213 OFFICE O/P EST LOW 20 MIN: CPT | Performed by: OPHTHALMOLOGY

## 2023-07-27 ASSESSMENT — REFRACTION_AUTOREFRACTION
OS_AXIS: 078
OD_SPHERE: +0.75
OS_SPHERE: +0.50
OS_CYLINDER: -1.25
OD_CYLINDER: -1.25
OD_AXIS: 072

## 2023-07-27 ASSESSMENT — AXIALLENGTH_DERIVED
OS_AL: 23.5489
OS_AL: 23.6463
OD_AL: 23.2721
OS_AL: 23.5489
OD_AL: 23.2248
OD_AL: 23.3673

## 2023-07-27 ASSESSMENT — VISUAL ACUITY
OD_BCVA: 20/40+3
OS_BCVA: 20/30-1

## 2023-07-27 ASSESSMENT — REFRACTION_MANIFEST
OD_SPHERE: +0.75
OS_ADD: +3.00
OD_CYLINDER: -1.75
OS_VA2: 20/20(J1+)
OD_VA2: 20/20(J1+)
OD_SPHERE: +0.75
OS_VA1: 20/30
OD_ADD: +3.00
OS_CYLINDER: -1.25
OD_CYLINDER: -1.00
OD_AXIS: 070
OD_VA2: 20/20(J1+)
OS_AXIS: 080
OS_AXIS: 080
OS_SPHERE: +0.50
OS_CYLINDER: -1.25
OD_VA1: 20/30
OS_VA1: 20/30
OS_SPHERE: +0.25
OD_AXIS: 070
OD_ADD: +2.75
OD_VA1: 20/30
OU_VA: 20/25
OS_ADD: +2.75
OU_VA: 20/25
OS_VA2: 20/20(J1+)

## 2023-07-27 ASSESSMENT — REFRACTION_CURRENTRX
OS_CYLINDER: -1.50
OD_OVR_VA: 20/
OD_SPHERE: +0.50
OD_AXIS: 091
OD_CYLINDER: -1.00
OS_ADD: +2.75
OS_SPHERE: +0.75
OS_OVR_VA: 20/
OD_VPRISM_DIRECTION: BF
OS_AXIS: 090
OS_VPRISM_DIRECTION: BF
OD_ADD: +2.75

## 2023-07-27 ASSESSMENT — SUPERFICIAL PUNCTATE KERATITIS (SPK)
OS_SPK: 1+
OD_SPK: 1+

## 2023-07-27 ASSESSMENT — KERATOMETRY
OD_AXISANGLE_DEGREES: 122
OD_K2POWER_DIOPTERS: 44.75
OS_K2POWER_DIOPTERS: 43.75
OD_K1POWER_DIOPTERS: 43.75
OS_AXISANGLE_DEGREES: 090
OS_K1POWER_DIOPTERS: 43.75

## 2023-07-27 ASSESSMENT — TONOMETRY
OD_IOP_MMHG: 18
OS_IOP_MMHG: 18

## 2023-07-27 ASSESSMENT — SPHEQUIV_DERIVED
OS_SPHEQUIV: -0.125
OD_SPHEQUIV: 0.125
OS_SPHEQUIV: -0.375
OD_SPHEQUIV: -0.125
OS_SPHEQUIV: -0.125
OD_SPHEQUIV: 0.25

## 2023-07-27 ASSESSMENT — LID EXAM ASSESSMENTS
OS_BLEPHARITIS: LUL 1+
OD_BLEPHARITIS: RUL 1+

## 2023-07-27 ASSESSMENT — CONFRONTATIONAL VISUAL FIELD TEST (CVF)
OS_FINDINGS: FULL
OD_FINDINGS: FULL

## 2023-07-28 ENCOUNTER — APPOINTMENT (OUTPATIENT)
Dept: CARDIOLOGY | Facility: CLINIC | Age: 88
End: 2023-07-28
Payer: MEDICARE

## 2023-07-28 VITALS
DIASTOLIC BLOOD PRESSURE: 60 MMHG | HEIGHT: 60 IN | OXYGEN SATURATION: 94 % | HEART RATE: 74 BPM | WEIGHT: 200 LBS | BODY MASS INDEX: 39.27 KG/M2 | SYSTOLIC BLOOD PRESSURE: 114 MMHG

## 2023-07-28 PROCEDURE — 99214 OFFICE O/P EST MOD 30 MIN: CPT

## 2023-07-28 NOTE — REASON FOR VISIT
[Other: ____] : [unfilled] [FreeTextEntry1] : Ling is an 89-year-old female with history of hypertension, DM 2, obesity, HFpEF,  LVEF 60% echo Oct 2021, edema, claudication, palpitations, partial deafness.\par She was seen in hospital follow-up.  She was admitted to [facility] Lisa 3, 2023 with chest pain.  I have reviewed cardiology notes.  EKGs.  Echocardiogram.  Labs.\par She was ruled out for microinfarction.  There was no element of volume overload.  And she was discharged home.  She comes in for follow-up.  Since discharge no further chest pain.\donna Walks few thousand steps a day there is no complaint of PND orthopnea or palpitation.\par \par Patient has dyspnea with moderate exertion. Patient has complaints of leg claudication and nocturnal leg cramping. \par \par \par

## 2023-07-28 NOTE — PHYSICAL EXAM
[Well Developed] : well developed [Well Nourished] : well nourished [No Acute Distress] : no acute distress [Obese] : obese [Normal Conjunctiva] : normal conjunctiva [Normal Venous Pressure] : normal venous pressure [No Carotid Bruit] : no carotid bruit [Normal S1, S2] : normal S1, S2 [No Murmur] : no murmur [No Rub] : no rub [No Gallop] : no gallop [Clear Lung Fields] : clear lung fields [Good Air Entry] : good air entry [No Respiratory Distress] : no respiratory distress  [Soft] : abdomen soft [Non Tender] : non-tender [No Masses/organomegaly] : no masses/organomegaly [Normal Bowel Sounds] : normal bowel sounds [Normal Gait] : normal gait [No Cyanosis] : no cyanosis [No Clubbing] : no clubbing [No Varicosities] : no varicosities [No Rash] : no rash [No Skin Lesions] : no skin lesions [Moves all extremities] : moves all extremities [No Focal Deficits] : no focal deficits [Normal Speech] : normal speech [Alert and Oriented] : alert and oriented [Normal memory] : normal memory [de-identified] : mild bipedal edema, left > right

## 2023-07-28 NOTE — CARDIOLOGY SUMMARY
[de-identified] : EKG June 2, 2023 normal sinus rhythm nonspecific T wave change [de-identified] : June 2023 EF 55% mild tricuspid regurgitation mildly elevated pulmonary pressures [de-identified] : Lexiscan Myoview stress test Oct 2021, V EF 70%, normal perfusion, breast attenuation seen, hemic EKG response, no chest pain, baseline sinus rhythm.\par \par Echocardiogram October 2021, LVEF 60%, mild MR, normal RVSP.\par \par Carotid and abdominal ultrasound October 2021, mild nonobstructive plaque, normal abdominal aortic size.\par \par Exercise Myoview stress test June 2019, LVEF 65%, small fixed apical defect and the presence of breast attenuation and normal wall motion likely artifact, no chest pain, nonischemic EKG response, baseline sinus rhythm IRBBB low-voltage\par \par Echocardiogram May 2019, LVEF 65%, mild diastolic dysfunction, aneurysmal interatrial septum, normal RVSP\par \par Carotid Doppler May 2019 mild nonobstructive plaque

## 2023-07-28 NOTE — DISCUSSION/SUMMARY
[FreeTextEntry1] : Ling is a 89-year-old female with medical history detailed above and active medical issues including:\par \par -Chest pain.  Appears to be more likely related to reflux disease.  Responded to therapy.  No recurrent symptoms.  Nonischemic work-up while in [facility].  At present considering asymptomatic state at good workload at her age decided against any further ischemic evaluation.  She did have normal LV ejection fraction on echocardiogram and normal perfusion Myoview stress test with normal LVEF Oct 2021\par \par - Edema, HFpEF LVEF 60% echo Jan 2019, continue present medication\par \par - Hypertension average resting home BPs at giudeline goal on diuretic therapy goal less than 140/90.\par \par - Obesity.  Discussed calorie reduction and increased exercise as a weight reduction strategy.\par \par - Type 2 diabetes management with PCP\par \par - Dyslipidemia on Pravachol well tolerated continue with statin therapy.\par \par Counseling regarding low saturated fat, salt and carbohydrate intake was reviewed. Active lifestyle and regular. Exercise along with weight management is advised.\par All the above were at length reviewed. Answered all the questions. Thank you very much for this kind referral. Please do not hesitate to give me a call for any question.\par Part of this transcription was done with voice recognition software and phonetically similar errors are common. I apologize for that. Please do not hesitate to call for any questions due to above.\par \par Sincerely,\par Chris Mae MD,FACC,BAY\par

## 2023-10-17 ENCOUNTER — APPOINTMENT (OUTPATIENT)
Dept: CARDIOLOGY | Facility: CLINIC | Age: 88
End: 2023-10-17
Payer: MEDICARE

## 2023-10-17 PROCEDURE — 93306 TTE W/DOPPLER COMPLETE: CPT

## 2023-10-17 PROCEDURE — 93880 EXTRACRANIAL BILAT STUDY: CPT

## 2023-10-17 PROCEDURE — 93979 VASCULAR STUDY: CPT

## 2023-11-09 ENCOUNTER — APPOINTMENT (OUTPATIENT)
Dept: CARDIOLOGY | Facility: CLINIC | Age: 88
End: 2023-11-09
Payer: MEDICARE

## 2023-11-09 VITALS
HEIGHT: 60 IN | BODY MASS INDEX: 39.27 KG/M2 | HEART RATE: 75 BPM | SYSTOLIC BLOOD PRESSURE: 124 MMHG | DIASTOLIC BLOOD PRESSURE: 70 MMHG | WEIGHT: 200 LBS | OXYGEN SATURATION: 96 %

## 2023-11-09 PROCEDURE — 99214 OFFICE O/P EST MOD 30 MIN: CPT

## 2023-12-22 NOTE — PHYSICAL EXAM
Waiting on prior auth approval or denial.        [Normal Appearance] : normal appearance [Eyelids - No Xanthelasma] : the eyelids demonstrated no xanthelasmas [No Oral Pallor] : no oral pallor [Normal Jugular Venous A Waves Present] : normal jugular venous A waves present [Respiration, Rhythm And Depth] : normal respiratory rhythm and effort [Heart Rate And Rhythm] : heart rate and rhythm were normal [Heart Sounds] : normal S1 and S2 [Bowel Sounds] : normal bowel sounds [Abdomen Soft] : soft [Abdomen Tenderness] : non-tender [Abnormal Walk] : normal gait [Nail Clubbing] : no clubbing of the fingernails [Cyanosis, Localized] : no localized cyanosis [Petechial Hemorrhages (___cm)] : no petechial hemorrhages [Skin Color & Pigmentation] : normal skin color and pigmentation [Skin Turgor] : normal skin turgor [] : no rash [Oriented To Time, Place, And Person] : oriented to person, place, and time [Well Developed] : well developed [Well Nourished] : well nourished [No Acute Distress] : no acute distress [Obese] : obese [Normal Conjunctiva] : normal conjunctiva [Normal Venous Pressure] : normal venous pressure [No Carotid Bruit] : no carotid bruit [Normal S1, S2] : normal S1, S2 [No Murmur] : no murmur [No Rub] : no rub [No Gallop] : no gallop [Clear Lung Fields] : clear lung fields [Good Air Entry] : good air entry [No Respiratory Distress] : no respiratory distress  [Soft] : abdomen soft [Non Tender] : non-tender [No Masses/organomegaly] : no masses/organomegaly [Normal Bowel Sounds] : normal bowel sounds [Normal Gait] : normal gait [No Edema] : no edema [No Cyanosis] : no cyanosis [No Clubbing] : no clubbing [No Varicosities] : no varicosities [No Rash] : no rash [No Skin Lesions] : no skin lesions [Moves all extremities] : moves all extremities [No Focal Deficits] : no focal deficits [Normal Speech] : normal speech [Alert and Oriented] : alert and oriented [Normal memory] : normal memory [FreeTextEntry1] : obesity

## 2024-05-29 PROBLEM — J44.9 CHRONIC OBSTRUCTIVE PULMONARY DISEASE, UNSPECIFIED COPD TYPE: Status: ACTIVE | Noted: 2019-01-08

## 2024-05-29 PROBLEM — Z87.898 HISTORY OF PALPITATIONS: Status: ACTIVE | Noted: 2019-01-08

## 2024-05-29 PROBLEM — Z09 HOSPITAL DISCHARGE FOLLOW-UP: Status: ACTIVE | Noted: 2023-07-28

## 2024-05-29 PROBLEM — R07.89 CHEST DISCOMFORT: Status: ACTIVE | Noted: 2023-07-28

## 2024-05-29 PROBLEM — I34.0 NONRHEUMATIC MITRAL (VALVE) INSUFFICIENCY: Status: ACTIVE | Noted: 2019-01-08

## 2024-05-29 PROBLEM — E11.65 CONTROLLED TYPE 2 DIABETES MELLITUS WITH HYPERGLYCEMIA, WITHOUT LONG-TERM CURRENT USE OF INSULIN: Status: ACTIVE | Noted: 2019-01-08

## 2024-05-29 PROBLEM — E78.5 HYPERLIPIDEMIA, UNSPECIFIED HYPERLIPIDEMIA TYPE: Status: ACTIVE | Noted: 2018-04-24

## 2024-05-29 PROBLEM — R06.02 SOB (SHORTNESS OF BREATH): Status: ACTIVE | Noted: 2019-01-08

## 2024-05-30 ENCOUNTER — OFFICE (OUTPATIENT)
Dept: URBAN - METROPOLITAN AREA CLINIC 8 | Facility: CLINIC | Age: 89
Setting detail: OPHTHALMOLOGY
End: 2024-05-30
Payer: MEDICARE

## 2024-05-30 DIAGNOSIS — E11.9: ICD-10-CM

## 2024-05-30 DIAGNOSIS — Z96.1: ICD-10-CM

## 2024-05-30 DIAGNOSIS — H35.363: ICD-10-CM

## 2024-05-30 DIAGNOSIS — H01.004: ICD-10-CM

## 2024-05-30 DIAGNOSIS — H01.001: ICD-10-CM

## 2024-05-30 DIAGNOSIS — H52.4: ICD-10-CM

## 2024-05-30 DIAGNOSIS — H16.223: ICD-10-CM

## 2024-05-30 DIAGNOSIS — H02.831: ICD-10-CM

## 2024-05-30 DIAGNOSIS — H02.834: ICD-10-CM

## 2024-05-30 PROCEDURE — 92134 CPTRZ OPH DX IMG PST SGM RTA: CPT | Performed by: OPHTHALMOLOGY

## 2024-05-30 PROCEDURE — 92014 COMPRE OPH EXAM EST PT 1/>: CPT | Performed by: OPHTHALMOLOGY

## 2024-05-30 PROCEDURE — 92015 DETERMINE REFRACTIVE STATE: CPT | Performed by: OPHTHALMOLOGY

## 2024-05-30 ASSESSMENT — LID POSITION - DERMATOCHALASIS
OD_DERMATOCHALASIS: RUL 2+
OS_DERMATOCHALASIS: LUL 2+

## 2024-05-30 ASSESSMENT — CONFRONTATIONAL VISUAL FIELD TEST (CVF)
OS_FINDINGS: FULL
OD_FINDINGS: FULL

## 2024-05-30 ASSESSMENT — LID EXAM ASSESSMENTS
OD_BLEPHARITIS: RUL 1+
OS_BLEPHARITIS: LUL 1+

## 2024-06-03 ENCOUNTER — APPOINTMENT (OUTPATIENT)
Dept: CARDIOLOGY | Facility: CLINIC | Age: 89
End: 2024-06-03
Payer: MEDICARE

## 2024-06-03 VITALS
OXYGEN SATURATION: 93 % | HEART RATE: 52 BPM | HEIGHT: 60 IN | BODY MASS INDEX: 39.27 KG/M2 | WEIGHT: 200 LBS | SYSTOLIC BLOOD PRESSURE: 118 MMHG | DIASTOLIC BLOOD PRESSURE: 56 MMHG

## 2024-06-03 DIAGNOSIS — E11.65 TYPE 2 DIABETES MELLITUS WITH HYPERGLYCEMIA: ICD-10-CM

## 2024-06-03 DIAGNOSIS — E78.5 HYPERLIPIDEMIA, UNSPECIFIED: ICD-10-CM

## 2024-06-03 DIAGNOSIS — Z87.898 PERSONAL HISTORY OF OTHER SPECIFIED CONDITIONS: ICD-10-CM

## 2024-06-03 DIAGNOSIS — I34.0 NONRHEUMATIC MITRAL (VALVE) INSUFFICIENCY: ICD-10-CM

## 2024-06-03 DIAGNOSIS — R07.89 OTHER CHEST PAIN: ICD-10-CM

## 2024-06-03 DIAGNOSIS — J44.9 CHRONIC OBSTRUCTIVE PULMONARY DISEASE, UNSPECIFIED: ICD-10-CM

## 2024-06-03 DIAGNOSIS — Z09 ENCOUNTER FOR FOLLOW-UP EXAMINATION AFTER COMPLETED TREATMENT FOR CONDITIONS OTHER THAN MALIGNANT NEOPLASM: ICD-10-CM

## 2024-06-03 DIAGNOSIS — R06.02 SHORTNESS OF BREATH: ICD-10-CM

## 2024-06-03 PROCEDURE — 99215 OFFICE O/P EST HI 40 MIN: CPT

## 2024-06-03 PROCEDURE — G2211 COMPLEX E/M VISIT ADD ON: CPT

## 2024-06-03 NOTE — REASON FOR VISIT
[Other: ____] : [unfilled] [FreeTextEntry1] : Ling is an 89-year-old female with history of hypertension, DM 2, obesity, HFpEF,  LVEF 60% echo Oct 2021, edema, claudication, palpitations, partial deafness.  Patient has dyspnea with moderate exertion. Patient has complaints of leg claudication and nocturnal leg cramping. Cardiovascular review of symptoms is negative for exertional chest pain, palpitations, dizziness or syncope.  No PND or orthopnea leg edema.  No bleeding or black stool.  Patient has interrupted sleep pattern, home sleep study ordered  No exercise routine. Patient is walking less than 10 minutes on occasion.  Patient is maintaining 2,000 steps per day  Echocardiogram Oct 2023 LVEF 55 to 60%, mild TR, normal RVSP.  Carotid and abdominal ultrasound Oct 2023, mild nonobstructive plaque, normal abdominal aortic size.   Lexiscan Myoview stress test Oct 2021, V EF 70%, normal perfusion, breast attenuation seen, hemic EKG response, no chest pain, baseline sinus rhythm.  Echocardiogram October 2021, LVEF 60%, mild MR, normal RVSP.  Carotid and abdominal ultrasound October 2021, mild nonobstructive plaque, normal abdominal aortic size.  Exercise Myoview stress test June 2019, LVEF 65%, small fixed apical defect and the presence of breast attenuation and normal wall motion likely artifact, no chest pain, nonischemic EKG response, baseline sinus rhythm IRBBB low-voltage  Echocardiogram May 2019, LVEF 65%, mild diastolic dysfunction, aneurysmal interatrial septum, normal RVSP  Carotid Doppler May 2019 mild nonobstructive plaque

## 2024-06-03 NOTE — CARDIOLOGY SUMMARY
[de-identified] : EKG June 2, 2023 normal sinus rhythm nonspecific T wave change [de-identified] : June 2023 EF 55% mild tricuspid regurgitation mildly elevated pulmonary pressures [de-identified] : Lexiscan Myoview stress test Oct 2021, V EF 70%, normal perfusion, breast attenuation seen, hemic EKG response, no chest pain, baseline sinus rhythm.\par  \par  Echocardiogram October 2021, LVEF 60%, mild MR, normal RVSP.\par  \par  Carotid and abdominal ultrasound October 2021, mild nonobstructive plaque, normal abdominal aortic size.\par  \par  Exercise Myoview stress test June 2019, LVEF 65%, small fixed apical defect and the presence of breast attenuation and normal wall motion likely artifact, no chest pain, nonischemic EKG response, baseline sinus rhythm IRBBB low-voltage\par  \par  Echocardiogram May 2019, LVEF 65%, mild diastolic dysfunction, aneurysmal interatrial septum, normal RVSP\par  \par  Carotid Doppler May 2019 mild nonobstructive plaque

## 2024-06-03 NOTE — PHYSICAL EXAM
[Well Developed] : well developed [Well Nourished] : well nourished [No Acute Distress] : no acute distress [Obese] : obese [Normal Conjunctiva] : normal conjunctiva [Normal Venous Pressure] : normal venous pressure [No Carotid Bruit] : no carotid bruit [Normal S1, S2] : normal S1, S2 [No Murmur] : no murmur [No Rub] : no rub [No Gallop] : no gallop [Clear Lung Fields] : clear lung fields [Good Air Entry] : good air entry [No Respiratory Distress] : no respiratory distress  [Soft] : abdomen soft [Non Tender] : non-tender [No Masses/organomegaly] : no masses/organomegaly [Normal Bowel Sounds] : normal bowel sounds [Normal Gait] : normal gait [No Cyanosis] : no cyanosis [No Clubbing] : no clubbing [No Varicosities] : no varicosities [No Rash] : no rash [No Skin Lesions] : no skin lesions [Moves all extremities] : moves all extremities [No Focal Deficits] : no focal deficits [Normal Speech] : normal speech [Alert and Oriented] : alert and oriented [Normal memory] : normal memory [Normal Appearance] : normal appearance [Eyelids - No Xanthelasma] : the eyelids demonstrated no xanthelasmas [No Oral Pallor] : no oral pallor [Normal Jugular Venous A Waves Present] : normal jugular venous A waves present [Respiration, Rhythm And Depth] : normal respiratory rhythm and effort [Heart Rate And Rhythm] : heart rate and rhythm were normal [Heart Sounds] : normal S1 and S2 [Bowel Sounds] : normal bowel sounds [Abdomen Soft] : soft [Abdomen Tenderness] : non-tender [Abnormal Walk] : normal gait [Nail Clubbing] : no clubbing of the fingernails [Cyanosis, Localized] : no localized cyanosis [Petechial Hemorrhages (___cm)] : no petechial hemorrhages [Skin Color & Pigmentation] : normal skin color and pigmentation [Skin Turgor] : normal skin turgor [] : no rash [Oriented To Time, Place, And Person] : oriented to person, place, and time [de-identified] : mild bipedal edema, left > right [FreeTextEntry1] : obesity

## 2024-06-03 NOTE — DISCUSSION/SUMMARY
[FreeTextEntry1] : Patient has medical history detailed above and active medical issues including:  - Interrupted sleep pattern, home sleep study ordered  - Dyspnea on exertion, multiple CAD risk factors, sedentary lifestyle.  Normal perfusion Myoview stress test with normal LVEF Oct 2021  - Edema, HFpEF LVEF 60% echo Jan 2019, optimized on aldactone   - Hypertension average resting home BPs at giudeline goal on diuretic therapy  - Leg claudication and nocturnal leg cramps, MELY lower extremities has ordered.   - Obesity.  Discussed calorie reduction and increased exercise as a weight reduction strategy.  - Type 2 diabetes management with PCP  - Dyslipidemia on Pravachol well tolerated  Advised patient to follow active lifestyle with regular cardiovascular exercise. Patient educated on lifestyle and diet modification with low sodium low fat diet and avoidance of excessive alcohol. Patient is aware to call with any symptoms or concerns.    Patient will be seen in cardiology follow-up 6 months with echocardiogram. Current cardiac medications remain unchanged. Repeat labs will be ordered with CAIN prasad followup with Dr Eugene Lane for primary care  Total time spent 45 minutes, reviewing of test results, chart information, patient discussion, physical exam and completion of chart documentation.

## 2025-01-14 ENCOUNTER — APPOINTMENT (OUTPATIENT)
Dept: CARDIOLOGY | Facility: CLINIC | Age: 89
End: 2025-01-14
Payer: MEDICARE

## 2025-01-14 ENCOUNTER — NON-APPOINTMENT (OUTPATIENT)
Age: 89
End: 2025-01-14

## 2025-01-14 VITALS
OXYGEN SATURATION: 98 % | HEART RATE: 62 BPM | SYSTOLIC BLOOD PRESSURE: 118 MMHG | BODY MASS INDEX: 39.27 KG/M2 | WEIGHT: 200 LBS | DIASTOLIC BLOOD PRESSURE: 70 MMHG | HEIGHT: 60 IN

## 2025-01-14 DIAGNOSIS — E11.65 TYPE 2 DIABETES MELLITUS WITH HYPERGLYCEMIA: ICD-10-CM

## 2025-01-14 DIAGNOSIS — Z09 ENCOUNTER FOR FOLLOW-UP EXAMINATION AFTER COMPLETED TREATMENT FOR CONDITIONS OTHER THAN MALIGNANT NEOPLASM: ICD-10-CM

## 2025-01-14 DIAGNOSIS — J44.9 CHRONIC OBSTRUCTIVE PULMONARY DISEASE, UNSPECIFIED: ICD-10-CM

## 2025-01-14 DIAGNOSIS — R07.89 OTHER CHEST PAIN: ICD-10-CM

## 2025-01-14 DIAGNOSIS — R06.02 SHORTNESS OF BREATH: ICD-10-CM

## 2025-01-14 DIAGNOSIS — I34.0 NONRHEUMATIC MITRAL (VALVE) INSUFFICIENCY: ICD-10-CM

## 2025-01-14 DIAGNOSIS — E78.5 HYPERLIPIDEMIA, UNSPECIFIED: ICD-10-CM

## 2025-01-14 DIAGNOSIS — Z87.898 PERSONAL HISTORY OF OTHER SPECIFIED CONDITIONS: ICD-10-CM

## 2025-01-14 PROCEDURE — 99215 OFFICE O/P EST HI 40 MIN: CPT

## 2025-01-14 PROCEDURE — 93306 TTE W/DOPPLER COMPLETE: CPT

## 2025-01-14 PROCEDURE — G2211 COMPLEX E/M VISIT ADD ON: CPT

## 2025-04-08 ENCOUNTER — APPOINTMENT (OUTPATIENT)
Dept: CARDIOLOGY | Facility: CLINIC | Age: 89
End: 2025-04-08
Payer: MEDICARE

## 2025-04-08 VITALS
WEIGHT: 205 LBS | SYSTOLIC BLOOD PRESSURE: 138 MMHG | OXYGEN SATURATION: 96 % | BODY MASS INDEX: 40.04 KG/M2 | HEART RATE: 64 BPM | DIASTOLIC BLOOD PRESSURE: 68 MMHG

## 2025-04-08 DIAGNOSIS — E11.65 TYPE 2 DIABETES MELLITUS WITH HYPERGLYCEMIA: ICD-10-CM

## 2025-04-08 DIAGNOSIS — Z09 ENCOUNTER FOR FOLLOW-UP EXAMINATION AFTER COMPLETED TREATMENT FOR CONDITIONS OTHER THAN MALIGNANT NEOPLASM: ICD-10-CM

## 2025-04-08 DIAGNOSIS — R07.89 OTHER CHEST PAIN: ICD-10-CM

## 2025-04-08 DIAGNOSIS — I34.0 NONRHEUMATIC MITRAL (VALVE) INSUFFICIENCY: ICD-10-CM

## 2025-04-08 DIAGNOSIS — E78.5 HYPERLIPIDEMIA, UNSPECIFIED: ICD-10-CM

## 2025-04-08 DIAGNOSIS — R06.02 SHORTNESS OF BREATH: ICD-10-CM

## 2025-04-08 DIAGNOSIS — Z87.898 PERSONAL HISTORY OF OTHER SPECIFIED CONDITIONS: ICD-10-CM

## 2025-04-08 DIAGNOSIS — J44.9 CHRONIC OBSTRUCTIVE PULMONARY DISEASE, UNSPECIFIED: ICD-10-CM

## 2025-04-08 PROCEDURE — 99215 OFFICE O/P EST HI 40 MIN: CPT

## 2025-04-08 RX ORDER — ACETAMINOPHEN 325 MG
TABLET ORAL DAILY
Refills: 0 | Status: ACTIVE | COMMUNITY

## 2025-04-08 RX ORDER — ALLOPURINOL 100 MG/1
100 TABLET ORAL DAILY
Refills: 0 | Status: ACTIVE | COMMUNITY

## 2025-04-08 RX ORDER — MULTIVIT WITH MIN/ALA/HERBS 50 MG
TABLET ORAL DAILY
Refills: 0 | Status: ACTIVE | COMMUNITY

## 2025-04-08 RX ORDER — SPIRONOLACTONE 25 MG/1
25 TABLET ORAL
Qty: 45 | Refills: 1 | Status: ACTIVE | COMMUNITY
Start: 2025-04-08 | End: 1900-01-01

## 2025-05-23 ENCOUNTER — OFFICE (OUTPATIENT)
Dept: URBAN - METROPOLITAN AREA CLINIC 8 | Facility: CLINIC | Age: OVER 89
Setting detail: OPHTHALMOLOGY
End: 2025-05-23
Payer: MEDICARE

## 2025-05-23 DIAGNOSIS — H16.223: ICD-10-CM

## 2025-05-23 DIAGNOSIS — H01.004: ICD-10-CM

## 2025-05-23 DIAGNOSIS — H02.831: ICD-10-CM

## 2025-05-23 DIAGNOSIS — E11.9: ICD-10-CM

## 2025-05-23 DIAGNOSIS — H01.001: ICD-10-CM

## 2025-05-23 DIAGNOSIS — Z96.1: ICD-10-CM

## 2025-05-23 DIAGNOSIS — H35.363: ICD-10-CM

## 2025-05-23 DIAGNOSIS — H53.2: ICD-10-CM

## 2025-05-23 DIAGNOSIS — H02.834: ICD-10-CM

## 2025-05-23 PROCEDURE — 92134 CPTRZ OPH DX IMG PST SGM RTA: CPT | Performed by: OPHTHALMOLOGY

## 2025-05-23 PROCEDURE — 92014 COMPRE OPH EXAM EST PT 1/>: CPT | Performed by: OPHTHALMOLOGY

## 2025-05-23 ASSESSMENT — VISUAL ACUITY
OD_BCVA: 20/25
OS_BCVA: 20/25-

## 2025-05-23 ASSESSMENT — REFRACTION_AUTOREFRACTION
OS_AXIS: 080
OD_SPHERE: +0.50
OS_SPHERE: +0.25
OS_CYLINDER: -0.50
OD_CYLINDER: -1.00
OD_AXIS: 070

## 2025-05-23 ASSESSMENT — REFRACTION_MANIFEST
OS_ADD: +3.00
OS_CYLINDER: -1.50
OD_AXIS: 075
OS_SPHERE: +0.75
OS_CYLINDER: -1.50
OD_CYLINDER: -1.50
OD_VA2: 20/20(J1+)
OD_AXIS: 070
OS_ADD: +3.00
OD_ADD: +3.00
OS_AXIS: 075
OD_SPHERE: +0.50
OS_VA2: 20/20(J1+)
OD_SPHERE: +0.50
OD_VA2: 20/20(J1+)
OD_ADD: +3.00
OD_VA1: 20/25+2
OD_CYLINDER: -1.00
OS_AXIS: 080
OS_SPHERE: +0.25
OS_VA2: 20/20(J1+)
OS_VA1: 20/25-
OU_VA: 20/25

## 2025-05-23 ASSESSMENT — REFRACTION_CURRENTRX
OD_SPHERE: +0.50
OD_CYLINDER: -1.00
OS_CYLINDER: -1.50
OD_ADD: +2.50
OS_SPHERE: +0.75
OD_VPRISM_DIRECTION: BF
OS_OVR_VA: 20/
OS_ADD: +2.75
OS_AXIS: 090
OS_VPRISM_DIRECTION: BF
OD_OVR_VA: 20/
OD_AXIS: 087

## 2025-05-23 ASSESSMENT — CONFRONTATIONAL VISUAL FIELD TEST (CVF)
OS_FINDINGS: FULL
OD_FINDINGS: FULL

## 2025-05-23 ASSESSMENT — KERATOMETRY
OS_K2POWER_DIOPTERS: 44.00
OS_AXISANGLE_DEGREES: 090
OS_K1POWER_DIOPTERS: 44.00
OD_AXISANGLE_DEGREES: 156
OD_K2POWER_DIOPTERS: 44.50
OD_K1POWER_DIOPTERS: 43.00

## 2025-05-23 ASSESSMENT — LID EXAM ASSESSMENTS
OD_BLEPHARITIS: RUL 1+
OS_BLEPHARITIS: LUL 1+

## 2025-05-23 ASSESSMENT — LID POSITION - DERMATOCHALASIS
OD_DERMATOCHALASIS: RUL 2+
OS_DERMATOCHALASIS: LUL 2+

## 2025-05-23 ASSESSMENT — SUPERFICIAL PUNCTATE KERATITIS (SPK)
OD_SPK: 3+
OS_SPK: 3+

## 2025-05-23 ASSESSMENT — TONOMETRY
OS_IOP_MMHG: 20
OD_IOP_MMHG: 19

## 2025-07-25 ENCOUNTER — OFFICE (OUTPATIENT)
Dept: URBAN - METROPOLITAN AREA CLINIC 8 | Facility: CLINIC | Age: OVER 89
Setting detail: OPHTHALMOLOGY
End: 2025-07-25
Payer: MEDICARE

## 2025-07-25 DIAGNOSIS — H16.223: ICD-10-CM

## 2025-07-25 PROCEDURE — 99213 OFFICE O/P EST LOW 20 MIN: CPT | Performed by: OPHTHALMOLOGY

## 2025-07-25 ASSESSMENT — REFRACTION_MANIFEST
OD_SPHERE: +0.50
OD_ADD: +3.00
OD_VA2: 20/20(J1+)
OD_AXIS: 070
OS_VA1: 20/25-
OU_VA: 20/25
OS_AXIS: 080
OS_ADD: +3.00
OD_AXIS: 075
OD_ADD: +3.00
OS_SPHERE: +0.25
OD_VA2: 20/20(J1+)
OS_CYLINDER: -1.50
OD_CYLINDER: -1.50
OS_AXIS: 075
OS_ADD: +3.00
OS_VA2: 20/20(J1+)
OS_VA2: 20/20(J1+)
OD_CYLINDER: -1.00
OD_VA1: 20/25+2
OS_SPHERE: +0.75
OS_CYLINDER: -1.50
OD_SPHERE: +0.50

## 2025-07-25 ASSESSMENT — TONOMETRY
OS_IOP_MMHG: 19
OD_IOP_MMHG: 19

## 2025-07-25 ASSESSMENT — REFRACTION_CURRENTRX
OD_VPRISM_DIRECTION: BF
OS_OVR_VA: 20/
OS_CYLINDER: -1.50
OD_OVR_VA: 20/
OS_AXIS: 090
OD_CYLINDER: -1.00
OS_VPRISM_DIRECTION: BF
OD_ADD: +2.50
OD_SPHERE: +0.50
OS_ADD: +2.75
OD_AXIS: 087
OS_SPHERE: +0.75

## 2025-07-25 ASSESSMENT — REFRACTION_AUTOREFRACTION
OD_SPHERE: +0.50
OD_AXIS: 070
OD_CYLINDER: -1.00
OS_CYLINDER: -0.50
OS_AXIS: 080
OS_SPHERE: +0.25

## 2025-07-25 ASSESSMENT — CONFRONTATIONAL VISUAL FIELD TEST (CVF)
OD_FINDINGS: FULL
OS_FINDINGS: FULL

## 2025-07-25 ASSESSMENT — VISUAL ACUITY
OD_BCVA: 20/25-2
OS_BCVA: 20/25-2

## 2025-07-25 ASSESSMENT — LID POSITION - DERMATOCHALASIS
OS_DERMATOCHALASIS: LUL 2+
OD_DERMATOCHALASIS: RUL 2+

## 2025-07-25 ASSESSMENT — KERATOMETRY
OD_K1POWER_DIOPTERS: 43.00
OS_K2POWER_DIOPTERS: 44.00
OD_K2POWER_DIOPTERS: 44.50
OD_AXISANGLE_DEGREES: 156
OS_K1POWER_DIOPTERS: 44.00
OS_AXISANGLE_DEGREES: 090

## 2025-07-25 ASSESSMENT — SUPERFICIAL PUNCTATE KERATITIS (SPK)
OD_SPK: 1+
OS_SPK: 1+

## 2025-07-25 ASSESSMENT — LID EXAM ASSESSMENTS
OD_BLEPHARITIS: RUL 1+
OS_BLEPHARITIS: LUL 1+